# Patient Record
Sex: FEMALE | Race: BLACK OR AFRICAN AMERICAN | NOT HISPANIC OR LATINO
[De-identification: names, ages, dates, MRNs, and addresses within clinical notes are randomized per-mention and may not be internally consistent; named-entity substitution may affect disease eponyms.]

---

## 2019-08-15 ENCOUNTER — TRANSCRIPTION ENCOUNTER (OUTPATIENT)
Age: 39
End: 2019-08-15

## 2020-11-25 ENCOUNTER — EMERGENCY (EMERGENCY)
Facility: HOSPITAL | Age: 40
LOS: 1 days | Discharge: HOME | End: 2020-11-25
Attending: EMERGENCY MEDICINE
Payer: SELF-PAY

## 2020-11-25 VITALS
RESPIRATION RATE: 20 BRPM | HEART RATE: 106 BPM | SYSTOLIC BLOOD PRESSURE: 138 MMHG | OXYGEN SATURATION: 97 % | TEMPERATURE: 98 F | DIASTOLIC BLOOD PRESSURE: 86 MMHG

## 2020-11-25 VITALS — HEIGHT: 64 IN | WEIGHT: 179.9 LBS

## 2020-11-25 PROCEDURE — 99282 EMERGENCY DEPT VISIT SF MDM: CPT

## 2020-11-25 PROCEDURE — 99053 MED SERV 10PM-8AM 24 HR FAC: CPT

## 2020-11-25 NOTE — ED PROVIDER NOTE - NEUROLOGICAL, MLM
Alert and oriented, no focal deficits, no motor or sensory deficits, nl sensation throughout R foot and toes as well as L thigh

## 2020-11-25 NOTE — ED ADULT TRIAGE NOTE - HEIGHT IN INCHES
GENERAL: non-toxic appearing, in NAD  HEAD: atraumatic, normocephalic  EYES: vision grossly intact, no conjunctivitis or discharge  EARS: hearing grossly intact  NOSE: no nasal discharge, epistaxis   CARDIAC: RRR, normal S1S2,  no appreciable murmurs, no cyanosis, cap refill < 2 seconds  PULM: no respiratory distress, oxygen saturation on RA wnl, CTAB, no crackles, rales, rhonchi, or wheezing  GI: abdomen nondistended, soft, nontender, no guarding or rebound tenderness, no palpable masses  NEURO: awake and alert, follow commands, normal speech, PERRLA, EOMI, no focal motor or sensory deficits, normal gait  MSK: spine appears normal, no joint swelling or erythema, no gross deformities of extremities  EXT: no peripheral edema, calf tenderness, redness or swelling  SKIN: warm, dry, and intact, no rashes  PSYCH: appropriate mood and affect
4
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
## 2020-11-25 NOTE — ED PROVIDER NOTE - MUSCULOSKELETAL, MLM
Spine appears normal, range of motion is not limited, no muscle or joint tenderness, FROM R foot and toes and L thigh
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
## 2020-11-25 NOTE — ED PROVIDER NOTE - NSFOLLOWUPINSTRUCTIONS_ED_ALL_ED_FT
Follow up with primary medical doctor in 2-3 days    Burn    A burn is an injury to your skin or the tissues under your skin usually caused by heat or caustic chemicals. In severe cases, a burn can damage the muscles and bones under the skin. There are three different degrees of burns: first (mild), second, and third (severe). Make sure to use any prescribed ointments as directed. If you were prescribed antibiotic medicine, take it as told by your health care provider. Do not stop using the antibiotic even if your condition improves. Follow up is available at the burn clinic.    SEEK IMMEDIATE MEDICAL CARE IF YOU HAVE ANY OF THE FOLLOWING SYMPTOMS: red streaks near the burn, severe pain, or fever.

## 2020-11-25 NOTE — ED PROVIDER NOTE - PATIENT PORTAL LINK FT
You can access the FollowMyHealth Patient Portal offered by [facility] by registering at the following website: [email or web address]. By joining [facility]’s FollowMyHealth portal, you will also be able to view your health information using other applications (apps) compatible with our system.

## 2021-01-29 PROBLEM — Z00.00 ENCOUNTER FOR PREVENTIVE HEALTH EXAMINATION: Status: ACTIVE | Noted: 2021-01-29

## 2021-01-29 PROBLEM — Z78.9 OTHER SPECIFIED HEALTH STATUS: Chronic | Status: ACTIVE | Noted: 2020-11-25

## 2021-03-09 ENCOUNTER — APPOINTMENT (OUTPATIENT)
Dept: FAMILY MEDICINE | Facility: CLINIC | Age: 41
End: 2021-03-09

## 2023-05-13 ENCOUNTER — EMERGENCY (EMERGENCY)
Facility: HOSPITAL | Age: 43
LOS: 1 days | Discharge: HOME | End: 2023-05-13
Attending: EMERGENCY MEDICINE
Payer: COMMERCIAL

## 2023-05-13 VITALS
RESPIRATION RATE: 18 BRPM | HEART RATE: 84 BPM | DIASTOLIC BLOOD PRESSURE: 97 MMHG | OXYGEN SATURATION: 96 % | HEIGHT: 64 IN | WEIGHT: 190.04 LBS | TEMPERATURE: 99 F | SYSTOLIC BLOOD PRESSURE: 162 MMHG

## 2023-05-13 VITALS
TEMPERATURE: 98 F | DIASTOLIC BLOOD PRESSURE: 71 MMHG | SYSTOLIC BLOOD PRESSURE: 126 MMHG | OXYGEN SATURATION: 100 % | HEART RATE: 81 BPM | RESPIRATION RATE: 19 BRPM

## 2023-05-13 PROCEDURE — 99284 EMERGENCY DEPT VISIT MOD MDM: CPT

## 2023-05-13 PROCEDURE — 96372 THER/PROPH/DIAG INJ SC/IM: CPT

## 2023-05-13 RX ORDER — LIDOCAINE 4 G/100G
1 CREAM TOPICAL ONCE
Refills: 0 | Status: COMPLETED | OUTPATIENT
Start: 2023-05-13 | End: 2023-05-13

## 2023-05-13 RX ORDER — KETOROLAC TROMETHAMINE 30 MG/ML
30 SYRINGE (ML) INJECTION ONCE
Refills: 0 | Status: DISCONTINUED | OUTPATIENT
Start: 2023-05-13 | End: 2023-05-13

## 2023-05-13 RX ORDER — LIDOCAINE 4 G/100G
1 CREAM TOPICAL
Qty: 7 | Refills: 0
Start: 2023-05-13 | End: 2023-05-19

## 2023-05-13 RX ORDER — DIAZEPAM 5 MG
5 TABLET ORAL ONCE
Refills: 0 | Status: DISCONTINUED | OUTPATIENT
Start: 2023-05-13 | End: 2023-05-13

## 2023-05-13 RX ORDER — IBUPROFEN 200 MG
1 TABLET ORAL
Qty: 20 | Refills: 0
Start: 2023-05-13 | End: 2023-05-17

## 2023-05-13 RX ORDER — METHOCARBAMOL 500 MG/1
2 TABLET, FILM COATED ORAL
Qty: 24 | Refills: 0
Start: 2023-05-13 | End: 2023-05-16

## 2023-05-13 RX ADMIN — Medication 30 MILLIGRAM(S): at 14:52

## 2023-05-13 RX ADMIN — Medication 5 MILLIGRAM(S): at 14:53

## 2023-05-13 RX ADMIN — LIDOCAINE 1 PATCH: 4 CREAM TOPICAL at 14:53

## 2023-05-13 NOTE — ED PROVIDER NOTE - PHYSICAL EXAMINATION
Constitutional: Awake, alert and oriented. In no acute distress. Well appearing.  HEENT: NC/AT. Moist mucous membranes.  Eyes: No scleral icterus. EOMI.  Neck:. Soft and supple. Full ROM without pain.  Cardiac: Regular rate and regular rhythm. +S1/S2. Peripheral pulses 2+ and symmetric. No LE edema.  Respiratory: Speaking in full sentences. No evidence of respiratory distress. No wheezes, rales or rhonchi.  Abdomen: Soft, non-distended and non tender Normal bowel sounds in all 4 quadrants. No guarding or rebound. no CVAT  Back: No midline thoracic/lumbar tenderness. No step off deformities. No fluctuance/induration/redness/drainage.   Skin: No rashes, abrasions or lacerations.  Lymph: No cervical lymphadenopathy.  Neuro: Awake, alert & oriented x 3. Moves all extremities symmetrically. Negative pronator drift, strength 5/5 all upper and lower extremities, sensation to light touch intact throughout upper/ lower extremities and face, finger to nose coordination intact, cranial nerves 2-12 intact  Psych: calm, cooperative, normal affect

## 2023-05-13 NOTE — ED ADULT NURSE NOTE - OBJECTIVE STATEMENT
Patient is alert and oriented X4 from home. Patient is c/o bad back pain, when walking goes down the left leg & mid back  Patient stated it started last nigh 9pm

## 2023-05-13 NOTE — ED PROVIDER NOTE - OBJECTIVE STATEMENT
44 y/o F with no reported PMHx presenting with chronic lower back pain radiating to LLE which got worse since yesterday evening.  Denies any trauma/injury. Did not take any meds for relief of symptoms. Denies bowel/urinary incontinence, saddle paresthesias, numbness over lower extremities, weakness, abdominal pain, dysuria/hematuria, dizziness, headaches, n/v, fever/chills, changes in vision, neck pain, sob, cp, cough and with no other c/o.  Denies IV drug use.

## 2023-05-13 NOTE — ED PROVIDER NOTE - NS ED ATTENDING STATEMENT MOD
This was a shared visit with the DENNYS. I reviewed and verified the documentation and independently performed the documented:

## 2023-05-13 NOTE — ED PROVIDER NOTE - CLINICAL SUMMARY MEDICAL DECISION MAKING FREE TEXT BOX
42 y/o F with no reported PMHx presenting with chronic lower back pain radiating to LLE which got worse since yesterday evening. Pt reports improvement of symptoms after pain meds. No imaging needed at this time. No red flags. Rx lidocaine patch/robaxin/prednisone/motrin and f/u with spine clinic in 2-3 days.  Strict ED return precautions given if any new or worsening symptoms

## 2023-05-13 NOTE — ED ADULT NURSE NOTE - NS ED NURSE LEVEL OF CONSCIOUSNESS AFFECT
07/21/17 1307   Discharge Assessment   Assessment Type Discharge Planning Assessment   Confirmed/corrected address and phone number on facesheet? Yes   Assessment information obtained from? Patient   If Healthcare Directive is received, is it scanned into Epic? no (comment)   Prior to hospitilization cognitive status: Alert/Oriented   Prior to hospitalization functional status: Independent   Current cognitive status: Alert/Oriented   Current Functional Status: Independent   Arrived From home or self-care   Lives With sibling(s);child(tamara), dependent   Able to Return to Prior Arrangements yes   Is patient able to care for self after discharge? Yes   How many people do you have in your home that can help with your care after discharge? 1   Who are your caregiver(s) and their phone number(s)? Gabriela pt mother [phone]   Patient's perception of discharge disposition admitted as an inpatient;home or selfcare   Readmission Within The Last 30 Days no previous admission in last 30 days   Patient currently being followed by outpatient case management? No   Patient currently receives home health services? No   Does the patient currently use HME? No   Patient currently receives private duty nursing? No   Patient currently receives any other outside agency services? No   Equipment Currently Used at Home none   Do you have any problems affording any of your prescribed medications? No   Is the patient taking medications as prescribed? no   If no, which medications is patient not taking? Iron Pills. According to pt, the pills make her stomach hurt.    Do you have any financial concerns preventing you from receiving the healthcare you need? No   Does the patient have transportation to healthcare appointments? Yes   Transportation Available car;family or friend will provide   On Dialysis? No   Does the patient receive services at the Coumadin Clinic? No   Are there any open cases? No   Discharge Plan A Home with family  "  Discharge Plan B Home with family   Patient/Family In Agreement With Plan yes     Pt demographics verified. SW updated pt address, and phone number in system. Pt informed SW she follows-up at the [facility] at any locations. According to pt, she would like morning appointments. SW to patient's room to discuss Helping the patient manage care at home. JER roll explained to pt.  Teach back method used.  SW's name and contact info placed on white board.  Pt/family encouraged to call for any problems/concerns with DC. "Discharge planning begins on Admission" pamphlet discussed and placed in "My Health Packet" and placed at bedside.       [employer] [address] [facility] [place] [address] [facility] [place] [address]  [place] [address]  Phone: [phone] Fax: [phone]    " Calm

## 2023-05-13 NOTE — ED PROVIDER NOTE - PATIENT PORTAL LINK FT
(3) adequate
You can access the FollowMyHealth Patient Portal offered by [facility] by registering at the following website: [email or web address]. By joining [employer]’s FollowMyHealth portal, you will also be able to view your health information using other applications (apps) compatible with our system.

## 2023-05-13 NOTE — ED PROVIDER NOTE - NSFOLLOWUPINSTRUCTIONS_ED_ALL_ED_FT
Please take all prescribed medication with food  Do not drive and do not operate any heavy machinery while taking robaxin as it might cause drowsiness.  Follow up with your PCP in 2-3 days  Follow up with spine clinic  in 2-3 days  Return to the emergency room if you are experiencing any new or worsening symptoms    Back Pain    Back pain is very common in adults. The cause of back pain is rarely dangerous and the pain often gets better over time. The cause of your back pain may not be known and may include strain of muscles or ligaments, degeneration of the spinal disks, or arthritis. Occasionally the pain may radiate down your leg(s). Over-the-counter medicines to reduce pain and inflammation are often the most helpful. Stretching and remaining active frequently helps the healing process.     SEEK IMMEDIATE MEDICAL CARE IF YOU HAVE ANY OF THE FOLLOWING SYMPTOMS: bowel or bladder control problems, unusual weakness or numbness in your arms or legs, nausea or vomiting, abdominal pain, fever, dizziness/lightheadedness.

## 2023-05-13 NOTE — ED ADULT TRIAGE NOTE - CHIEF COMPLAINT QUOTE
pt c/o bad back pain, when walking goes down the left leg & mid back  A&Ox3, resp wnl, started last nigh 9pm

## 2023-05-13 NOTE — ED PROVIDER NOTE - CARE PROVIDER_API CALL
Wilfredo Young; PhD)  Neurosurgery  [address] [place] [address]  Phone: [phone]  Fax: [phone]  Follow Up Time: 7-10 Days

## 2023-05-30 ENCOUNTER — APPOINTMENT (OUTPATIENT)
Dept: NEUROSURGERY | Facility: CLINIC | Age: 43
End: 2023-05-30

## 2023-06-02 NOTE — ED ADULT TRIAGE NOTE - NS ED NURSE DIRECT TO ROOM YN
Can you change this script? No Spironolactone Pregnancy And Lactation Text: This medication can cause feminization of the male fetus and should be avoided during pregnancy. The active metabolite is also found in breast milk.

## 2023-06-07 ENCOUNTER — APPOINTMENT (OUTPATIENT)
Dept: NEUROSURGERY | Facility: CLINIC | Age: 43
End: 2023-06-07

## 2024-01-28 ENCOUNTER — NON-APPOINTMENT (OUTPATIENT)
Age: 44
End: 2024-01-28

## 2025-02-21 NOTE — ED ADULT TRIAGE NOTE - HEIGHT IN FEET
Problem: Chronic Conditions and Co-morbidities  Goal: Patient's chronic conditions and co-morbidity symptoms are monitored and maintained or improved  2/21/2025 1037 by Adriana Wood RN  Outcome: Progressing  2/21/2025 1037 by Adriana Wood RN  Outcome: Progressing  2/21/2025 0049 by Felicita Henderson RN  Outcome: Progressing     Problem: Discharge Planning  Goal: Discharge to home or other facility with appropriate resources  2/21/2025 1037 by Adriana Wood RN  Outcome: Progressing  2/21/2025 1037 by Adriana Wood RN  Outcome: Progressing  2/21/2025 0049 by Felicita Henderson RN  Outcome: Progressing     Problem: Pain  Goal: Verbalizes/displays adequate comfort level or baseline comfort level  2/21/2025 1037 by Adriana Wood RN  Outcome: Progressing  2/21/2025 1037 by Adriana Wood RN  Outcome: Progressing  2/21/2025 0049 by Felicita Henderson RN  Outcome: Progressing     Problem: Safety - Adult  Goal: Free from fall injury  2/21/2025 1037 by Adriana Wood RN  Outcome: Progressing  2/21/2025 1037 by Adriana Wood RN  Outcome: Progressing  2/21/2025 0049 by Felicita Henderson RN  Outcome: Progressing     Problem: ABCDS Injury Assessment  Goal: Absence of physical injury  2/21/2025 1037 by Adriana Wood RN  Outcome: Progressing  2/21/2025 1037 by Adriana Wood RN  Outcome: Progressing  2/21/2025 0049 by Felicita Henderson RN  Outcome: Progressing     Problem: Cardiovascular - Adult  Goal: Maintains optimal cardiac output and hemodynamic stability  2/21/2025 1037 by Adriana Wood RN  Outcome: Progressing  2/21/2025 1037 by Adriana Wood RN  Outcome: Progressing  2/21/2025 0049 by Felicita Henderson RN  Outcome: Progressing  Goal: Absence of cardiac dysrhythmias or at baseline  2/21/2025 1037 by Adriana Wood RN  Outcome: Progressing  2/21/2025 1037 by Adriana Wood  5 RN  Outcome: Progressing  Goal: Absence of infection during hospitalization  2/21/2025 1037 by Adriana Wood RN  Outcome: Progressing  2/21/2025 1037 by Adriana Wood RN  Outcome: Progressing  2/21/2025 0049 by Felicita Henderson RN  Outcome: Progressing     Problem: Metabolic/Fluid and Electrolytes - Adult  Goal: Electrolytes maintained within normal limits  2/21/2025 1037 by Adriana Wood RN  Outcome: Progressing  2/21/2025 1037 by Adriana Wood RN  Outcome: Progressing  2/21/2025 0049 by Felicita Henderson RN  Outcome: Progressing  Goal: Hemodynamic stability and optimal renal function maintained  2/21/2025 1037 by Adriana Wood RN  Outcome: Progressing  2/21/2025 1037 by Adriana Wood RN  Outcome: Progressing  2/21/2025 0049 by Felicita Henderson RN  Outcome: Progressing  Goal: Glucose maintained within prescribed range  2/21/2025 1037 by Adriana Wood RN  Outcome: Progressing  2/21/2025 1037 by Adriana Wood RN  Outcome: Progressing  2/21/2025 0049 by Felicita Henderson RN  Outcome: Progressing     Problem: Hematologic - Adult  Goal: Maintains hematologic stability  2/21/2025 1037 by Adriana Wood RN  Outcome: Progressing  2/21/2025 1037 by Adriana Wood RN  Outcome: Progressing  2/21/2025 0049 by Felicita Henderson RN  Outcome: Progressing     Problem: Neurosensory - Adult  Goal: Achieves stable or improved neurological status  2/21/2025 1037 by Adriana Wood RN  Outcome: Progressing  2/21/2025 1037 by Adriana Wood RN  Outcome: Progressing  2/21/2025 0049 by Felicita Henderson RN  Outcome: Progressing     Problem: Respiratory - Adult  Goal: Achieves optimal ventilation and oxygenation  2/21/2025 1037 by Adriana Wood RN  Outcome: Progressing  2/21/2025 1037 by Adriana Wood, RN  Outcome: Progressing  2/21/2025 0049 by Felicita Henderson, LAURA  Outcome: